# Patient Record
Sex: MALE | Race: WHITE | Employment: FULL TIME | URBAN - METROPOLITAN AREA
[De-identification: names, ages, dates, MRNs, and addresses within clinical notes are randomized per-mention and may not be internally consistent; named-entity substitution may affect disease eponyms.]

---

## 2017-02-28 ENCOUNTER — OFFICE VISIT (OUTPATIENT)
Dept: INTERNAL MEDICINE CLINIC | Facility: CLINIC | Age: 62
End: 2017-02-28

## 2017-02-28 VITALS
SYSTOLIC BLOOD PRESSURE: 120 MMHG | HEART RATE: 70 BPM | OXYGEN SATURATION: 97 % | BODY MASS INDEX: 30.48 KG/M2 | TEMPERATURE: 98 F | HEIGHT: 72.5 IN | DIASTOLIC BLOOD PRESSURE: 80 MMHG | RESPIRATION RATE: 16 BRPM | WEIGHT: 227.5 LBS

## 2017-02-28 DIAGNOSIS — M23.91 DERANGEMENT, KNEE INTERNAL, RIGHT: ICD-10-CM

## 2017-02-28 DIAGNOSIS — Z01.818 PREOPERATIVE CLEARANCE: Primary | ICD-10-CM

## 2017-02-28 DIAGNOSIS — G35 MULTIPLE SCLEROSIS (HCC): ICD-10-CM

## 2017-02-28 DIAGNOSIS — Z12.5 SCREENING PSA (PROSTATE SPECIFIC ANTIGEN): ICD-10-CM

## 2017-02-28 DIAGNOSIS — E78.00 HYPERCHOLESTEROLEMIA: ICD-10-CM

## 2017-02-28 PROCEDURE — 93000 ELECTROCARDIOGRAM COMPLETE: CPT | Performed by: INTERNAL MEDICINE

## 2017-02-28 PROCEDURE — 99244 OFF/OP CNSLTJ NEW/EST MOD 40: CPT | Performed by: INTERNAL MEDICINE

## 2017-02-28 NOTE — PROGRESS NOTES
Patient presents with:  Pre-Op Exam: repair tear/cyst removal 3-17-17 dr. Wojciech Monroy       HPI: The pt presents today for preop clearance.   In particular, he is set to undergo elective surgery w/ Dr. Eddie Muhammad from Orthopedics for chronic R knee pain and internal thyromegaly  Lymph - no palp LAD  Lungs - CTAB  CV - RRR, nl s1, s2  Abd - soft, NABS, NT, ND  Ext - no c/c/e  Neuro - CNs 2-12 grossly intact, no focal deficits; 2+ DTRs  Psych - nl mood/affect      Diagnostics:  EKG done today shows NSR, rate 64, nl inte

## 2017-03-08 ENCOUNTER — LAB ENCOUNTER (OUTPATIENT)
Dept: LAB | Age: 62
End: 2017-03-08
Attending: INTERNAL MEDICINE
Payer: COMMERCIAL

## 2017-03-08 DIAGNOSIS — Z12.5 SCREENING PSA (PROSTATE SPECIFIC ANTIGEN): ICD-10-CM

## 2017-03-08 DIAGNOSIS — E78.00 HYPERCHOLESTEROLEMIA: ICD-10-CM

## 2017-03-08 DIAGNOSIS — G35 MULTIPLE SCLEROSIS (HCC): ICD-10-CM

## 2017-03-08 LAB
ALBUMIN SERPL-MCNC: 3.7 G/DL (ref 3.5–4.8)
ALP LIVER SERPL-CCNC: 82 U/L (ref 45–117)
ALT SERPL-CCNC: 23 U/L (ref 17–63)
AST SERPL-CCNC: 21 U/L (ref 15–41)
BASOPHILS # BLD AUTO: 0.02 X10(3) UL (ref 0–0.1)
BASOPHILS NFR BLD AUTO: 0.3 %
BILIRUB SERPL-MCNC: 0.5 MG/DL (ref 0.1–2)
BILIRUB UR QL STRIP.AUTO: NEGATIVE
BUN BLD-MCNC: 14 MG/DL (ref 8–20)
CALCIUM BLD-MCNC: 9.1 MG/DL (ref 8.3–10.3)
CHLORIDE: 108 MMOL/L (ref 101–111)
CHOLEST SMN-MCNC: 180 MG/DL (ref ?–200)
CLARITY UR REFRACT.AUTO: CLEAR
CO2: 24 MMOL/L (ref 22–32)
COLOR UR AUTO: YELLOW
COMPLEXED PSA SERPL-MCNC: 2 NG/ML (ref 0.01–4)
CREAT BLD-MCNC: 0.94 MG/DL (ref 0.7–1.3)
EOSINOPHIL # BLD AUTO: 0.09 X10(3) UL (ref 0–0.3)
EOSINOPHIL NFR BLD AUTO: 1.3 %
ERYTHROCYTE [DISTWIDTH] IN BLOOD BY AUTOMATED COUNT: 13.3 % (ref 11.5–16)
EST. AVERAGE GLUCOSE BLD GHB EST-MCNC: 111 MG/DL (ref 68–126)
GLUCOSE BLD-MCNC: 100 MG/DL (ref 70–99)
GLUCOSE UR STRIP.AUTO-MCNC: NEGATIVE MG/DL
HBA1C MFR BLD HPLC: 5.5 % (ref ?–5.7)
HCT VFR BLD AUTO: 46.9 % (ref 37–53)
HDLC SERPL-MCNC: 67 MG/DL (ref 45–?)
HDLC SERPL: 2.69 {RATIO} (ref ?–4.97)
HGB BLD-MCNC: 15.5 G/DL (ref 13–17)
IMMATURE GRANULOCYTE COUNT: 0.01 X10(3) UL (ref 0–1)
IMMATURE GRANULOCYTE RATIO %: 0.1 %
LDLC SERPL CALC-MCNC: 87 MG/DL (ref ?–130)
LEUKOCYTE ESTERASE UR QL STRIP.AUTO: NEGATIVE
LYMPHOCYTES # BLD AUTO: 1.31 X10(3) UL (ref 0.9–4)
LYMPHOCYTES NFR BLD AUTO: 19 %
M PROTEIN MFR SERPL ELPH: 7.3 G/DL (ref 6.1–8.3)
MCH RBC QN AUTO: 29.8 PG (ref 27–33.2)
MCHC RBC AUTO-ENTMCNC: 33 G/DL (ref 31–37)
MCV RBC AUTO: 90.2 FL (ref 80–99)
MONOCYTES # BLD AUTO: 0.75 X10(3) UL (ref 0.1–0.6)
MONOCYTES NFR BLD AUTO: 10.9 %
NEUTROPHIL ABS PRELIM: 4.71 X10 (3) UL (ref 1.3–6.7)
NEUTROPHILS # BLD AUTO: 4.71 X10(3) UL (ref 1.3–6.7)
NEUTROPHILS NFR BLD AUTO: 68.4 %
NITRITE UR QL STRIP.AUTO: NEGATIVE
NONHDLC SERPL-MCNC: 113 MG/DL (ref ?–130)
PH UR STRIP.AUTO: 5 [PH] (ref 4.5–8)
PLATELET # BLD AUTO: 258 10(3)UL (ref 150–450)
POTASSIUM SERPL-SCNC: 4.1 MMOL/L (ref 3.6–5.1)
PROT UR STRIP.AUTO-MCNC: NEGATIVE MG/DL
RBC # BLD AUTO: 5.2 X10(6)UL (ref 4.3–5.7)
RBC UR QL AUTO: NEGATIVE
RED CELL DISTRIBUTION WIDTH-SD: 43.9 FL (ref 35.1–46.3)
SODIUM SERPL-SCNC: 140 MMOL/L (ref 136–144)
SP GR UR STRIP.AUTO: 1.02 (ref 1–1.03)
TRIGLYCERIDES: 129 MG/DL (ref ?–150)
UROBILINOGEN UR STRIP.AUTO-MCNC: <2 MG/DL
VLDL: 26 MG/DL (ref 5–40)
WBC # BLD AUTO: 6.9 X10(3) UL (ref 4–13)

## 2017-03-08 PROCEDURE — 83036 HEMOGLOBIN GLYCOSYLATED A1C: CPT

## 2017-03-08 PROCEDURE — 85025 COMPLETE CBC W/AUTO DIFF WBC: CPT

## 2017-03-08 PROCEDURE — 81003 URINALYSIS AUTO W/O SCOPE: CPT

## 2017-03-08 PROCEDURE — 80061 LIPID PANEL: CPT

## 2017-03-08 PROCEDURE — 36415 COLL VENOUS BLD VENIPUNCTURE: CPT

## 2017-03-08 PROCEDURE — 80053 COMPREHEN METABOLIC PANEL: CPT

## 2017-08-10 RX ORDER — ATORVASTATIN CALCIUM 20 MG/1
TABLET, FILM COATED ORAL
Qty: 30 TABLET | Refills: 2 | Status: SHIPPED | OUTPATIENT
Start: 2017-08-10 | End: 2017-11-08

## 2017-11-08 RX ORDER — ATORVASTATIN CALCIUM 20 MG/1
TABLET, FILM COATED ORAL
Qty: 30 TABLET | Refills: 2 | Status: SHIPPED | OUTPATIENT
Start: 2017-11-08 | End: 2018-02-11

## 2017-11-08 NOTE — TELEPHONE ENCOUNTER
Requesting   LOV: 2-28-17  RTC: 1 year prn   Last Relevant Labs: 3-8-17  Filled: 8-10-17 #30  with 2 refills    No future appointments.

## 2018-01-28 ENCOUNTER — TELEPHONE (OUTPATIENT)
Dept: INTERNAL MEDICINE CLINIC | Facility: CLINIC | Age: 63
End: 2018-01-28

## 2018-01-28 DIAGNOSIS — Z12.11 SCREENING FOR COLORECTAL CANCER: Primary | ICD-10-CM

## 2018-01-28 DIAGNOSIS — Z12.12 SCREENING FOR COLORECTAL CANCER: Primary | ICD-10-CM

## 2018-01-28 NOTE — TELEPHONE ENCOUNTER
Call pt. I'm doing chart review. I do not see record of him having colorectal cancer screening via either colonoscopy or stool analysis. Tell him it's really easy to satisfy this requirement with an annual stool test (FIT).   I've gone ahead and ordered

## 2018-02-13 RX ORDER — ATORVASTATIN CALCIUM 20 MG/1
TABLET, FILM COATED ORAL
Qty: 30 TABLET | Refills: 2 | Status: SHIPPED | OUTPATIENT
Start: 2018-02-13 | End: 2018-05-11

## 2018-02-13 NOTE — TELEPHONE ENCOUNTER
Medication(s) to Refill:   Pending Prescriptions Disp Refills    ATORVASTATIN 20 MG Oral Tab [Pharmacy Med Name: ATORVASTATIN 20 MG TABLET] 30 tablet 2     Sig: TAKE 1 TABLET BY MOUTH NIGHTLY           Last Time Medication was Filled: 11/8/17 with 30 and 2

## 2018-05-11 RX ORDER — ATORVASTATIN CALCIUM 20 MG/1
TABLET, FILM COATED ORAL
Qty: 30 TABLET | Refills: 0 | Status: SHIPPED | OUTPATIENT
Start: 2018-05-11

## 2018-05-11 NOTE — TELEPHONE ENCOUNTER
Refill requested: Atorvastatin 20 mg     Failed protocol    Last refill: 2/13/18 #30 2R  Relevant Labs: Last Lipid 3/8/17   Last OV / RTC advised: 2/28/17 RTC in 1 year or PRN    Appt Scheduled: No      **LMTCB - overdue for appt

## 2018-05-11 NOTE — TELEPHONE ENCOUNTER
Last office visit 2/28/17 with Dr. Danilo Estrada. Last lipid 3/8/17. Last refill 2/13/18 (#30, 2 refills).

## 2018-05-11 NOTE — TELEPHONE ENCOUNTER
Only quant #30 given. Due for OV. Mena Romero. Rockne Najjar, MD  Diplomate, American Board of Internal Medicine  [facility]  [address] [facility],[address], [address], [facility], [address] [place] [address]  T: [id number]; F: [address]

## 2024-04-27 NOTE — MR AVS SNAPSHOT
Problem: Adult Inpatient Plan of Care  Goal: Plan of Care Review  4/27/2024 1005 by Ginette Askew RN  Outcome: Progressing  4/27/2024 1005 by Ginette Askew RN  Outcome: Progressing  Goal: Patient-Specific Goal (Individualized)  4/27/2024 1005 by Ginette Askew RN  Outcome: Progressing  4/27/2024 1005 by Ginette Askew RN  Outcome: Progressing  Goal: Absence of Hospital-Acquired Illness or Injury  4/27/2024 1005 by Ginette Askew RN  Outcome: Progressing  4/27/2024 1005 by Ginette Askew RN  Outcome: Progressing  Goal: Optimal Comfort and Wellbeing  4/27/2024 1005 by Ginette Askew RN  Outcome: Progressing  4/27/2024 1005 by Ginette Askew RN  Outcome: Progressing  Goal: Readiness for Transition of Care  4/27/2024 1005 by Ginette Askew RN  Outcome: Progressing  4/27/2024 1005 by Ginette Askew RN  Outcome: Progressing     Problem: Diabetes Comorbidity  Goal: Blood Glucose Level Within Targeted Range  4/27/2024 1005 by Ginette Askew, RN  Outcome: Progressing  4/27/2024 1005 by Ginette Askew RN  Outcome: Progressing     Problem: Wound  Goal: Optimal Coping  4/27/2024 1005 by Ginette Askew RN  Outcome: Progressing  4/27/2024 1005 by Ginette Askew RN  Outcome: Progressing  Goal: Optimal Functional Ability  4/27/2024 1005 by Ginette Askew RN  Outcome: Progressing  4/27/2024 1005 by Ginette Askew RN  Outcome: Progressing  Goal: Absence of Infection Signs and Symptoms  4/27/2024 1005 by Ginette Askew RN  Outcome: Progressing  4/27/2024 1005 by Ginette Askew RN  Outcome: Progressing  Goal: Improved Oral Intake  4/27/2024 1005 by Ginette Askew RN  Outcome: Progressing  4/27/2024 1005 by Ginette Askew RN  Outcome: Progressing  Goal: Optimal Pain Control and Function  4/27/2024 1005 by Ginette Askew RN  Outcome: Progressing  4/27/2024 1005 by Gintete Askew RN  Outcome: Progressing  Goal: Skin Health and Integrity  4/27/2024  [place]  [address] [facility] [address]  [address] [facility] [address] [phone]               Thank you for choosing us for your health care visit with Nicolette Hidalgo MD.  We are glad to serve you and happy to provide you with this summa 1005 by Ginette Askew RN  Outcome: Progressing  4/27/2024 1005 by Ginette Askew RN  Outcome: Progressing  Goal: Optimal Wound Healing  4/27/2024 1005 by Ginette Askew RN  Outcome: Progressing  4/27/2024 1005 by Ginette Askew RN  Outcome: Progressing     Problem: Infection  Goal: Absence of Infection Signs and Symptoms  4/27/2024 1005 by Ginette Askew RN  Outcome: Progressing  4/27/2024 1005 by Ginette Askew RN  Outcome: Progressing     Problem: Fall Injury Risk  Goal: Absence of Fall and Fall-Related Injury  4/27/2024 1005 by Ginette Askew RN  Outcome: Progressing  4/27/2024 1005 by Ginette Askew RN  Outcome: Progressing      120/80 mmHg 70 97.6 °F (36.4 °C) (Oral) 72.5\" 227 lb 8 oz 30.41 kg/m2         Current Medications          This list is accurate as of: 2/28/17 12:01 PM.  Always use your most recent med list.                Atorvastatin Calcium 20 MG Tabs   TAKE 1 TABLE Start activities slowly and build up over time Do what you like   Get your heart pumping – brisk walking, biking, swimming Even 10 minute increments are effective and add up over the week   2 ½ hours per week – spread out over time Use a arpit to keep you